# Patient Record
Sex: FEMALE | Race: WHITE | NOT HISPANIC OR LATINO | Employment: FULL TIME | URBAN - METROPOLITAN AREA
[De-identification: names, ages, dates, MRNs, and addresses within clinical notes are randomized per-mention and may not be internally consistent; named-entity substitution may affect disease eponyms.]

---

## 2019-04-21 ENCOUNTER — OFFICE VISIT (OUTPATIENT)
Dept: URGENT CARE | Facility: PHYSICIAN GROUP | Age: 29
End: 2019-04-21
Payer: COMMERCIAL

## 2019-04-21 VITALS
SYSTOLIC BLOOD PRESSURE: 118 MMHG | OXYGEN SATURATION: 98 % | DIASTOLIC BLOOD PRESSURE: 68 MMHG | RESPIRATION RATE: 16 BRPM | HEART RATE: 74 BPM | BODY MASS INDEX: 23.05 KG/M2 | HEIGHT: 64 IN | TEMPERATURE: 98.3 F | WEIGHT: 135 LBS

## 2019-04-21 DIAGNOSIS — L30.9 DERMATITIS: ICD-10-CM

## 2019-04-21 PROCEDURE — 99203 OFFICE O/P NEW LOW 30 MIN: CPT | Performed by: FAMILY MEDICINE

## 2019-04-21 RX ORDER — NORETHINDRONE ACETATE AND ETHINYL ESTRADIOL 1.5-30(21)
KIT ORAL
Refills: 3 | COMMUNITY
Start: 2019-02-16

## 2019-04-21 RX ORDER — TRIAMCINOLONE ACETONIDE 1 MG/G
OINTMENT TOPICAL
Qty: 1 TUBE | Refills: 1 | Status: SHIPPED | OUTPATIENT
Start: 2019-04-21

## 2019-04-21 ASSESSMENT — ENCOUNTER SYMPTOMS
EYE REDNESS: 0
EYE DISCHARGE: 0
WEIGHT LOSS: 0

## 2019-04-21 NOTE — PROGRESS NOTES
"Subjective:      Camille L Glanzmann is a 28 y.o. female who presents with Rash (Rash/itchiness on buttocks/innner thighs x2weeks )            2 weeks itching bumps primarily to buttocks and less to thighs and  distal extremities. No vesicles. No vaginal discharge. ? Bug bite. No oral lesions. No scalp. No PMH chronic derm. No fever. OTC HC has helped slightly. No other aggravating or alleviating factors.      No hot tub    Review of Systems   Constitutional: Negative for malaise/fatigue and weight loss.   Eyes: Negative for discharge and redness.   Skin: Negative for itching and rash.       .  Medications, Allergies, and current problem list reviewed today in Epic     Objective:     /68   Pulse 74   Temp 36.8 °C (98.3 °F) (Temporal)   Resp 16   Ht 1.626 m (5' 4\")   Wt 61.2 kg (135 lb)   SpO2 98%   Breastfeeding? No   BMI 23.17 kg/m²      Physical Exam   Constitutional: She is oriented to person, place, and time. She appears well-developed and well-nourished. No distress.   HENT:   Head: Normocephalic and atraumatic.   Mouth/Throat: Oropharynx is clear and moist.   No scalp lesions   Eyes: Conjunctivae are normal.   Musculoskeletal:        Legs:  Neurological: She is alert and oriented to person, place, and time.   Skin: Skin is warm and dry.               Assessment/Plan:     1. Dermatitis  triamcinolone acetonide (KENALOG) 0.1 % Ointment    mupirocin (BACTROBAN) 2 % Ointment     Differential diagnosis, natural history, supportive care, and indications for immediate follow-up discussed at length.     Suspect contact dermatitis.  Differential diagnosis includes infection.  It does not appear herpetic.  There is no central clearing.  At this point we will try medium potency topical corticosteroid.  If no resolution she will contact me.  "

## 2019-05-10 ENCOUNTER — HOSPITAL ENCOUNTER (OUTPATIENT)
Dept: LAB | Facility: MEDICAL CENTER | Age: 29
End: 2019-05-10
Attending: FAMILY MEDICINE
Payer: COMMERCIAL

## 2019-05-10 LAB
B-HCG SERPL-ACNC: <0.6 MIU/ML (ref 0–5)
T4 FREE SERPL-MCNC: 0.79 NG/DL (ref 0.53–1.43)
TSH SERPL DL<=0.005 MIU/L-ACNC: 4.4 UIU/ML (ref 0.38–5.33)

## 2019-05-10 PROCEDURE — 36415 COLL VENOUS BLD VENIPUNCTURE: CPT

## 2019-05-10 PROCEDURE — 84439 ASSAY OF FREE THYROXINE: CPT

## 2019-05-10 PROCEDURE — 84702 CHORIONIC GONADOTROPIN TEST: CPT

## 2019-05-10 PROCEDURE — 84443 ASSAY THYROID STIM HORMONE: CPT
